# Patient Record
Sex: MALE | Race: WHITE | NOT HISPANIC OR LATINO | URBAN - NONMETROPOLITAN AREA
[De-identification: names, ages, dates, MRNs, and addresses within clinical notes are randomized per-mention and may not be internally consistent; named-entity substitution may affect disease eponyms.]

---

## 2018-08-09 ENCOUNTER — APPOINTMENT (RX ONLY)
Dept: URBAN - NONMETROPOLITAN AREA CLINIC 26 | Facility: CLINIC | Age: 78
Setting detail: DERMATOLOGY
End: 2018-08-09

## 2018-08-09 DIAGNOSIS — T49.0X5 ADVERSE EFFECT OF LOCAL ANTIFUNGAL, ANTI-INFECTIVE AND ANTI-INFLAMMATORY DRUGS: ICD-10-CM

## 2018-08-09 DIAGNOSIS — L93.1 SUBACUTE CUTANEOUS LUPUS ERYTHEMATOSUS: ICD-10-CM

## 2018-08-09 PROBLEM — L85.3 XEROSIS CUTIS: Status: ACTIVE | Noted: 2018-08-09

## 2018-08-09 PROBLEM — L55.1 SUNBURN OF SECOND DEGREE: Status: ACTIVE | Noted: 2018-08-09

## 2018-08-09 PROBLEM — J45.909 UNSPECIFIED ASTHMA, UNCOMPLICATED: Status: ACTIVE | Noted: 2018-08-09

## 2018-08-09 PROBLEM — I25.10 ATHEROSCLEROTIC HEART DISEASE OF NATIVE CORONARY ARTERY WITHOUT ANGINA PECTORIS: Status: ACTIVE | Noted: 2018-08-09

## 2018-08-09 PROBLEM — M12.9 ARTHROPATHY, UNSPECIFIED: Status: ACTIVE | Noted: 2018-08-09

## 2018-08-09 PROBLEM — T49.0X5A ADVERSE EFFECT OF LOCAL ANTIFUNGAL, ANTI-INFECTIVE AND ANTI-INFLAMMATORY DRUGS, INITIAL ENCOUNTER: Status: ACTIVE | Noted: 2018-08-09

## 2018-08-09 PROBLEM — L29.8 OTHER PRURITUS: Status: ACTIVE | Noted: 2018-08-09

## 2018-08-09 PROBLEM — J30.1 ALLERGIC RHINITIS DUE TO POLLEN: Status: ACTIVE | Noted: 2018-08-09

## 2018-08-09 PROBLEM — I48.91 UNSPECIFIED ATRIAL FIBRILLATION: Status: ACTIVE | Noted: 2018-08-09

## 2018-08-09 PROCEDURE — 99202 OFFICE O/P NEW SF 15 MIN: CPT

## 2018-08-09 PROCEDURE — ? COUNSELING

## 2018-08-09 PROCEDURE — ? ADDITIONAL NOTES

## 2018-08-09 ASSESSMENT — LOCATION DETAILED DESCRIPTION DERM
LOCATION DETAILED: LEFT SUPERIOR LATERAL MALAR CHEEK
LOCATION DETAILED: RIGHT INFERIOR CENTRAL MALAR CHEEK

## 2018-08-09 ASSESSMENT — LOCATION ZONE DERM: LOCATION ZONE: FACE

## 2018-08-09 ASSESSMENT — LOCATION SIMPLE DESCRIPTION DERM
LOCATION SIMPLE: RIGHT CHEEK
LOCATION SIMPLE: LEFT CHEEK

## 2018-08-09 NOTE — PROCEDURE: ADDITIONAL NOTES
Additional Notes: SHE IS ON AZATHIOPRINE 150MG DAILY AND TOPICAL ELIDEL. SHE HAS USED TRIAMCINOLONE AND CLOBETASOL TOPICALLY IN THE PAST AND HAD SOME STEROID ATROPHY AS A RESULT, SO SHE IS NOW ONLY USING ELIDEL TOPICAL.\\n\\nSBRITTANY HAS HAD THIS RASH FOR MANY YEARS AND HAS SEEN MANY PHYSICIANS ABOUT IT. SHE IS ALSO BEING MANAGED BY [employer]. 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 BASED ON HER CLINICAL APPEARANCE TODAY, I WOULD FAVOR SCLE, WHICH WOULD FIT IF SHE ALSO HAS ANTI-SSA OR ANTI-SSB SJOGREN ANTIBODIES. IN ORDER TO CLARIFY WHAT HAS BEEN TRIED TREATMENT WISE AND WHAT HAS ALREADY BEEN DONE IN LABS, SHE WILL COMPLETE A RECORD RELEASE FOR HER [facility] AND OTHER MAJOR DOCTORS WHO HAVE BEEN MANAGING THIS THUS FAR AS SHE HAS A LONG AND COMPLICATED HISTORY. ONCE RECORDS RECEIVED, WILL DETERMINE IF ANY FURTHER LAB WORKUP NEEDED OR IF ANY CHANGE IN MEDICATION (OR ADDITION) IS WARRANTED AS SHE CONTINUES TO HAVE SEVERE SKIN DISEASE DESPITE BEING ON HIGH DOSE IMURAN. WILL LIKELY NEED TO COORDINATE CARE WITH HER R[facility]

## 2018-11-15 ENCOUNTER — RX ONLY (OUTPATIENT)
Age: 78
Setting detail: RX ONLY
End: 2018-11-15

## 2018-11-15 RX ORDER — PIMECROLIMUS 10 MG/G
CREAM TOPICAL
Qty: 1 | Refills: 3 | Status: ERX

## 2018-11-20 ENCOUNTER — RX ONLY (OUTPATIENT)
Age: 78
Setting detail: RX ONLY
End: 2018-11-20

## 2018-11-20 RX ORDER — TACROLIMUS 1 MG/G
OINTMENT TOPICAL
Qty: 1 | Refills: 3 | Status: ERX | COMMUNITY
Start: 2018-11-20